# Patient Record
(demographics unavailable — no encounter records)

---

## 2024-12-09 NOTE — REASON FOR VISIT
[Home] : at home, [unfilled] , at the time of the visit. [Medical Office: ([facility])___] : at the medical office located in  [Patient] : the patient [Self] : self [Consultation] : a consultation visit [FreeTextEntry1] : Referred by Dr. Robledo outside referring for nonhealing ulcer.

## 2024-12-09 NOTE — ASSESSMENT
[FreeTextEntry1] : 52-year-old male with persistent gastric ulcer despite PPI treatment noted on recent surveillance endoscopy.  Reviewed all reports and discussed the findings with the patient.  Discussed the recommendations for further evaluation with endoscopic ultrasound.     We discussed the proposed procedure, preparation and alternatives.  The risks (bleeding, perforation, infection) and benefits were discussed with the patient in details.  The patient understands the risks/benefits and agreed to proceed with procedure.   Plan 1.  EUS EGD with Dr. Donna Anthony on 1/30/2025 2.  Instructions to be mailed.  All questions answered.  Patient to call me with any questions.

## 2024-12-09 NOTE — HISTORY OF PRESENT ILLNESS
[FreeTextEntry1] : 52-year-old [place]ian speaking male with no significant medical history.  He recently underwent EGD surveillance for stomach ulcer noting persistent and worsening peptic ulcer disease despite PPI twice daily treatment since April 2024.  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  Patient presents today to discuss further evaluation with endoscopic ultrasound.  Today patient denies bloating, belching, regurgitation, nausea, vomiting, weight loss. [de-identified] : 4/8/2024 -Grade a esophagitis, irregular Z-line, esophageal ulcer, small, clean based and nonbleeding, (multiple) -Gastritis fundus, body, antrum gastric ulcer -biopsies taken of edge, large, clean-based, nonbleeding, (multiple) -Duodenum normal mucosa, biopsies taken to rule out celiac disease.